# Patient Record
Sex: FEMALE | Race: BLACK OR AFRICAN AMERICAN
[De-identification: names, ages, dates, MRNs, and addresses within clinical notes are randomized per-mention and may not be internally consistent; named-entity substitution may affect disease eponyms.]

---

## 2019-12-12 ENCOUNTER — HOSPITAL ENCOUNTER (EMERGENCY)
Dept: HOSPITAL 54 - ER | Age: 24
Discharge: HOME | End: 2019-12-12
Payer: SELF-PAY

## 2019-12-12 VITALS — SYSTOLIC BLOOD PRESSURE: 122 MMHG | DIASTOLIC BLOOD PRESSURE: 67 MMHG

## 2019-12-12 VITALS — WEIGHT: 135 LBS | BODY MASS INDEX: 21.69 KG/M2 | HEIGHT: 66 IN

## 2019-12-12 DIAGNOSIS — F20.9: ICD-10-CM

## 2019-12-12 DIAGNOSIS — R45.851: Primary | ICD-10-CM

## 2019-12-12 LAB
ALBUMIN SERPL BCP-MCNC: 4.5 G/DL (ref 3.4–5)
ALP SERPL-CCNC: 65 U/L (ref 46–116)
ALT SERPL W P-5'-P-CCNC: 16 U/L (ref 12–78)
APAP SERPL-MCNC: < 10 UG/ML (ref 10–30)
APPEARANCE UR: (no result)
AST SERPL W P-5'-P-CCNC: 13 U/L (ref 15–37)
BASOPHILS # BLD AUTO: 0.1 /CMM (ref 0–0.2)
BASOPHILS NFR BLD AUTO: 0.7 % (ref 0–2)
BILIRUB DIRECT SERPL-MCNC: 0.2 MG/DL (ref 0–0.2)
BILIRUB SERPL-MCNC: 0.7 MG/DL (ref 0.2–1)
BILIRUB UR QL STRIP: (no result)
BUN SERPL-MCNC: 16 MG/DL (ref 7–18)
CALCIUM SERPL-MCNC: 9.6 MG/DL (ref 8.5–10.1)
CHLORIDE SERPL-SCNC: 100 MMOL/L (ref 98–107)
CO2 SERPL-SCNC: 24 MMOL/L (ref 21–32)
COLOR UR: YELLOW
CREAT SERPL-MCNC: 0.7 MG/DL (ref 0.6–1.3)
EOSINOPHIL NFR BLD AUTO: 0.2 % (ref 0–6)
ETHANOL SERPL-MCNC: < 3 MG/DL (ref 0–0)
GLUCOSE SERPL-MCNC: 93 MG/DL (ref 74–106)
GLUCOSE UR STRIP-MCNC: NEGATIVE MG/DL
HCT VFR BLD AUTO: 41 % (ref 33–45)
HGB BLD-MCNC: 13.4 G/DL (ref 11.5–14.8)
HGB UR QL STRIP: (no result) ERY/UL
KETONES UR STRIP-MCNC: >=80 MG/DL
LEUKOCYTE ESTERASE UR QL STRIP: NEGATIVE
LYMPHOCYTES NFR BLD AUTO: 1.1 /CMM (ref 0.8–4.8)
LYMPHOCYTES NFR BLD AUTO: 15.7 % (ref 20–44)
MCHC RBC AUTO-ENTMCNC: 33 G/DL (ref 31–36)
MCV RBC AUTO: 84 FL (ref 82–100)
MONOCYTES NFR BLD AUTO: 0.4 /CMM (ref 0.1–1.3)
MONOCYTES NFR BLD AUTO: 5.6 % (ref 2–12)
NEUTROPHILS # BLD AUTO: 5.5 /CMM (ref 1.8–8.9)
NEUTROPHILS NFR BLD AUTO: 77.8 % (ref 43–81)
NITRITE UR QL STRIP: NEGATIVE
PH UR STRIP: 6 [PH] (ref 5–8)
PLATELET # BLD AUTO: 324 /CMM (ref 150–450)
POTASSIUM SERPL-SCNC: 3.6 MMOL/L (ref 3.5–5.1)
PROT SERPL-MCNC: 8.4 G/DL (ref 6.4–8.2)
PROT UR QL STRIP: (no result) MG/DL
RBC # BLD AUTO: 4.87 MIL/UL (ref 4–5.2)
RBC #/AREA URNS HPF: (no result) /HPF (ref 0–2)
SALICYLATES SERPL-MCNC: 1.5 MG/DL (ref 2.8–20)
SODIUM SERPL-SCNC: 138 MMOL/L (ref 136–145)
UROBILINOGEN UR STRIP-MCNC: 0.2 EU/DL
WBC #/AREA URNS HPF: (no result) /HPF (ref 0–3)
WBC NRBC COR # BLD AUTO: 7.1 K/UL (ref 4.3–11)

## 2019-12-12 PROCEDURE — 36415 COLL VENOUS BLD VENIPUNCTURE: CPT

## 2019-12-12 PROCEDURE — 80048 BASIC METABOLIC PNL TOTAL CA: CPT

## 2019-12-12 PROCEDURE — 80329 ANALGESICS NON-OPIOID 1 OR 2: CPT

## 2019-12-12 PROCEDURE — 80307 DRUG TEST PRSMV CHEM ANLYZR: CPT

## 2019-12-12 PROCEDURE — 85025 COMPLETE CBC W/AUTO DIFF WBC: CPT

## 2019-12-12 PROCEDURE — 80076 HEPATIC FUNCTION PANEL: CPT

## 2019-12-12 PROCEDURE — 87086 URINE CULTURE/COLONY COUNT: CPT

## 2019-12-12 PROCEDURE — 84703 CHORIONIC GONADOTROPIN ASSAY: CPT

## 2019-12-12 PROCEDURE — 80305 DRUG TEST PRSMV DIR OPT OBS: CPT

## 2019-12-12 PROCEDURE — 81001 URINALYSIS AUTO W/SCOPE: CPT

## 2019-12-12 PROCEDURE — G0480 DRUG TEST DEF 1-7 CLASSES: HCPCS

## 2019-12-12 PROCEDURE — 99284 EMERGENCY DEPT VISIT MOD MDM: CPT

## 2019-12-12 NOTE — NUR
Patient given written and verbal discharge instructions. Patient verbalizes 
understanding of instructions. Patient is ambulatory with steady gait. Refuses 
offer of shelter placement. Patient given list of available shelters in 
surrounding area. Tap card provided, in no distress, with appropriate clothing.

## 2019-12-12 NOTE — NUR
A/OX4, DENIES SI/HI, BREATHING EVEN AND UNLABORED, NO SOB NOTED, AMBULATORY IN 
STEADY GAIT. Patient given written and verbal discharge instructions. Patient 
verbalizes understanding of instructions. Patient is ambulatory with steady 
gait. Refuses offer of shelter placement. Patient given list of available 
shelters in surrounding area.

## 2019-12-12 NOTE — NUR
PT PLACED IN GOWN. BELONGINGS COLLECTED AND PLACED IN PATIENT LOCKER. PT 
REMAINS CALM AND COOPERATIVE. SITTER AT BEDSIDE. WILL CONTINUE TO MONITOR.

## 2019-12-12 NOTE — NUR
Social service consult requested by ER for homelessness and suicidal ideation. 
Pt is a 24 year old  female who was admitted to [facility] ER for 
behavioral. Pt was sitting up in her bed during assessment. Pt is ambulatory. 
Pt was very soft spoken and engaged minimally in SW assessment. Pt is alert and 
oriented x 2. When SW inquired about housing, pt responded "I live everywhere" 
and did not provide further information. When SW inquired about suicidal 
ideation, pt denied suicidal ideation or a plan to kill self. Pt denied drug, 
alcohol, marijuana, or cigarette use. When SW inquired about psychiatric 
diagnosis, pt responded "I am very sick" but did not provide further 
information. SW offered pt a referral to voluntarily admit self to [facility] for ongoing mental health support, but pt 
declined. SW offered pt shelter referrals but pt also declined. SW offered to 
contact pts next of kin, pts Mother, but pt declined to provide contact 
information. SW provided pt with the following referrals: [facility] 
[[place] [facility]];  address: [address] [place] [id number], [facility]: [address] [place] [address], and [facility] 
Station: [address] [address]. SW also provided pt with the 
following housing and health services referrals: [facility] [address] 
[place] [address]; [phone], [facility] [address] [place] [address]; [phone], and [facility] [address] [place] [address]; [phone]. The [facility]eless Resources Directory and health and mental health clinic 
referrals were also provided. Pt denies suicidal and homicidal ideation at this 
time. Pt signed the homeless waiver and it has been placed in her chart. Pt 
will require a TAP card upon discharge. No other services needed at this time. 
SW is available if needed.

## 2023-06-16 NOTE — NUR
follow-up: SW provided pt with clothing. [Straightening consistent with spasm] : Straightening consistent with spasm [AP] : anteroposterior [There are no fractures, subluxations or dislocations. No significant abnormalities are seen] : There are no fractures, subluxations or dislocations. No significant abnormalities are seen [de-identified] : LSPINE\par Inspection: No rash or ecchymosis\par Palpation: No tenderness to palpation or spasm in bilateral thoracic and lumbar paraspinal musculature, no SI joint tenderness to palpation\par ROM: Full with stiffness\par Strength: 5/5 bilateral hip flexors, knee extensors, ankle dorsiflexors, EHL, ankle plantarflexors\par Sensation: Sensation present to light touch bilateral L2-S1 distributions\par Provocative maneuvers: + R SLR, -L SLR\par \par Bilateral hips-\par Palpation: No tenderness to palpation over greater trochanter or IT band\par \par \par  [FreeTextEntry1] : Mild L5-S1 DDD